# Patient Record
Sex: FEMALE | Employment: FULL TIME | URBAN - METROPOLITAN AREA
[De-identification: names, ages, dates, MRNs, and addresses within clinical notes are randomized per-mention and may not be internally consistent; named-entity substitution may affect disease eponyms.]

---

## 2024-05-17 ENCOUNTER — TELEPHONE (OUTPATIENT)
Dept: ORTHOPEDIC SURGERY | Age: 34
End: 2024-05-17

## 2024-05-17 NOTE — TELEPHONE ENCOUNTER
Forgot to inform pt that the doors would be locked today and instead asked her to bring the MRI disc on Monday during clinic hours. Pt voiced understanding.

## 2024-05-17 NOTE — TELEPHONE ENCOUNTER
Called and spoke to pt about bringing her MRI disc by Tuesday morning otherwise appointment would be rescheduled. Pt voiced understanding. Pt said she would drop it off today.

## 2024-05-22 ENCOUNTER — TELEPHONE (OUTPATIENT)
Dept: ORTHOPEDIC SURGERY | Age: 34
End: 2024-05-22

## 2024-05-22 ENCOUNTER — OFFICE VISIT (OUTPATIENT)
Dept: ORTHOPEDIC SURGERY | Age: 34
End: 2024-05-22
Payer: OTHER GOVERNMENT

## 2024-05-22 VITALS — WEIGHT: 143 LBS | BODY MASS INDEX: 26.31 KG/M2 | HEIGHT: 62 IN

## 2024-05-22 DIAGNOSIS — S83.512A RUPTURE OF ANTERIOR CRUCIATE LIGAMENT OF LEFT KNEE, INITIAL ENCOUNTER: Primary | ICD-10-CM

## 2024-05-22 PROCEDURE — 99204 OFFICE O/P NEW MOD 45 MIN: CPT | Performed by: ORTHOPAEDIC SURGERY

## 2024-05-22 NOTE — TELEPHONE ENCOUNTER
She would like to ask a few more questions about recovery after surgery so she can plan to be out of work. Please call.

## 2024-05-22 NOTE — PROGRESS NOTES
Name: Isamar Reyes  YOB: 1990  Gender: female  MRN: [id number]      What: Left knee pain  How: Playing soccer  When: 3/14/2024        HPI: Isamar Reyes is a 33 y.o. female seen for left knee problems.  She is a [employer] .  No previous left knee problems.  She was playing soccer on 3/14/2024 when she is injured the left knee.  She felt a pop.  The knee swelled up.  It then got a little bit better.  She tried to play soccer again and it would give out on her.  She was sent for an MRI of the left knee.      ROS/Meds/PSH/PMH/FH/SH: A ten system review of systems was performed and is negative other than what is in the HPI.   Tobacco:  reports that she has never smoked. She has never used smokeless tobacco.  Ht 1.575 m (5' 2\")   Wt 64.9 kg (143 lb)   BMI 26.16 kg/m²      Physical Examination:  She is an awake alert pleasant female ambulating without difficulty    The right knee has a range of motion of 0 to 135 degrees  negative Lachman,  negative anterior drawer,   negative posterior drawer  negative pivot.   Good tibial step-off,   No varus or valgus laxity at 0 or 30 degrees.   Negative lateral joint line tenderness   negative lateral Aliza.   Negative medial joint line tenderness  negative medial Aliza.   No evidence of any posterolateral instability.   No patellofemoral pain.   Negative compression,   negative apprehension  no effusion.   Calves Are non-tender,  neurovascularly patient is intact.   Negative Homans.   MPFL is non-tender.   Patient Can fully extend the knee.   Good quad tone  No erythema.   Negative Dial test.    The left knee has a range of motion of 0 to 135 degrees  2+ to Lachman,  2+ anterior drawer,   negative posterior drawer  negative pivot.   Good tibial step-off,   No varus or valgus laxity at 0 or 30 degrees.   Positive lateral joint line tenderness   negative lateral Aliza.   Positive medial joint line tenderness  negative medial Aliza.   No evidence

## 2024-05-22 NOTE — TELEPHONE ENCOUNTER
Called and spoke to pt informing her that she will be able to return back to work 2 weeks after sx or whenever she feels comfortable per [facility]GP. Pt voiced understanding.

## 2024-05-29 ENCOUNTER — TELEPHONE (OUTPATIENT)
Dept: ORTHOPEDIC SURGERY | Age: 34
End: 2024-05-29

## 2024-05-29 NOTE — TELEPHONE ENCOUNTER
Pt  LVM  needing  a letter for  work  sating  her  recovery  time  return to  work  after surgery   Please  call

## 2024-05-29 NOTE — TELEPHONE ENCOUNTER
Called and spoke to pt about her employer requesting a work note. Agreed that we will keep her out of work for 2 weeks following sx and will reassess at her post op appt 6/19. Pt will  work note tomorrow.

## 2024-05-31 NOTE — PERIOP NOTE
Patient verified name and .  Order for consent not found in EHR.   Type 1B surgery, Pat phone assessment complete.  Labs per surgeon:   Orders not received.  Labs per anesthesia protocol: none.    Patient answered medical/surgical history questions at their best of ability. All prior to admission medications documented in EPIC.    Patient instructed to continue taking all prescription medications up to the day of surgery but to take only the following medications the day of surgery according to anesthesia guidelines with a small sip of water: none. Also, patient is requested to take 2 Tylenol in the morning and then again before bed on the day before surgery. Regular or extra strength may be used.       Patient informed that all vitamins and supplements should be held 7 days prior to surgery and NSAIDS 5 days prior to surgery.     Patient instructed on the following:    > Arrive at A Entrance, time of arrival to be called the day before by 1700  > NPO after midnight, unless otherwise indicated, including gum, mints, and ice chips  > Responsible adult must drive patient to the hospital, stay during surgery, and patient will need supervision 24 hours after anesthesia  > Use non moisturizing soap in shower the night before surgery and on the morning of surgery  > All piercings must be removed prior to arrival.    > Leave all valuables (money and jewelry) at home but bring insurance card and ID on DOS.   > Do not wear make-up, nail polish, lotions, cologne, perfumes, powders, or oil on skin. Artificial nails are not permitted.

## 2024-06-03 NOTE — H&P
Subjective:     Patient is a 33 y.o. FEMALE WITH LEFT KNEE PAIN.    SEE OFFICE NOTE.    Patient Active Problem List    Diagnosis Date Noted    Rupture of anterior cruciate ligament of left knee 05/22/2024     History reviewed. No pertinent past medical history.   History reviewed. No pertinent surgical history.   Prior to Admission medications    Not on File     No Known Allergies   Social History     Tobacco Use    Smoking status: Never    Smokeless tobacco: Never   Substance Use Topics    Alcohol use: Yes     Comment: occas      History reviewed. No pertinent family history.   Review of Systems  Pertinent items are noted in HPI.    Objective:     No data found.  Ht 1.575 m (5' 2\")   Wt 64.9 kg (143 lb)   BMI 26.16 kg/m²     General Appearance:    Alert, cooperative, no distress, appears stated age   Head:    Normocephalic, without obvious abnormality, atraumatic                       Back:     Symmetric, no curvature, ROM normal, no CVA tenderness   Lungs:     Clear to auscultation bilaterally, respirations unlabored   Chest Wall:    No tenderness or deformity    Heart:    Regular rate and rhythm, S1 and S2 normal, no murmur, rub   or gallop                   Extremities:   Extremities normal, atraumatic, no cyanosis or edema   Pulses:   2+ and symmetric all extremities   Skin:   Skin color, texture, turgor normal, no rashes or lesions   Lymph nodes:   Cervical, supraclavicular, and axillary nodes normal   Neurologic:   CNII-XII intact, normal strength, sensation and reflexes     throughout           Assessment:     Principal Problem:    Rupture of anterior cruciate ligament of left knee  Resolved Problems:    * No resolved hospital problems. *      Plan:     The various methods of treatment have been discussed with the patient and family.     PATIENT HAS EXHAUSTED NON-OPERATIVE MODALITIES     After consideration of risks, benefits and other options for treatment, the patient has consented to surgical

## 2024-06-05 ENCOUNTER — ANESTHESIA EVENT (OUTPATIENT)
Dept: SURGERY | Age: 34
End: 2024-06-05
Payer: OTHER GOVERNMENT

## 2024-06-05 ENCOUNTER — TELEPHONE (OUTPATIENT)
Dept: ORTHOPEDIC SURGERY | Age: 34
End: 2024-06-05

## 2024-06-05 ENCOUNTER — PREP FOR PROCEDURE (OUTPATIENT)
Dept: ORTHOPEDIC SURGERY | Age: 34
End: 2024-06-05

## 2024-06-05 DIAGNOSIS — S83.512A RUPTURE OF ANTERIOR CRUCIATE LIGAMENT OF LEFT KNEE, INITIAL ENCOUNTER: Primary | ICD-10-CM

## 2024-06-05 RX ORDER — SODIUM CHLORIDE 0.9 % (FLUSH) 0.9 %
5-40 SYRINGE (ML) INJECTION EVERY 12 HOURS SCHEDULED
Status: CANCELLED | OUTPATIENT
Start: 2024-06-05

## 2024-06-05 RX ORDER — SODIUM CHLORIDE 9 MG/ML
INJECTION, SOLUTION INTRAVENOUS PRN
Status: CANCELLED | OUTPATIENT
Start: 2024-06-05

## 2024-06-05 RX ORDER — SODIUM CHLORIDE 0.9 % (FLUSH) 0.9 %
5-40 SYRINGE (ML) INJECTION PRN
Status: CANCELLED | OUTPATIENT
Start: 2024-06-05

## 2024-06-06 ENCOUNTER — APPOINTMENT (OUTPATIENT)
Dept: GENERAL RADIOLOGY | Age: 34
End: 2024-06-06
Attending: ORTHOPAEDIC SURGERY
Payer: OTHER GOVERNMENT

## 2024-06-06 ENCOUNTER — PREP FOR PROCEDURE (OUTPATIENT)
Dept: ORTHOPEDIC SURGERY | Age: 34
End: 2024-06-06

## 2024-06-06 ENCOUNTER — ANESTHESIA (OUTPATIENT)
Dept: SURGERY | Age: 34
End: 2024-06-06
Payer: OTHER GOVERNMENT

## 2024-06-06 ENCOUNTER — HOSPITAL ENCOUNTER (OUTPATIENT)
Age: 34
Setting detail: OUTPATIENT SURGERY
Discharge: HOME | End: 2024-06-06
Attending: ORTHOPAEDIC SURGERY | Admitting: ORTHOPAEDIC SURGERY
Payer: OTHER GOVERNMENT

## 2024-06-06 VITALS
WEIGHT: 144.2 LBS | DIASTOLIC BLOOD PRESSURE: 74 MMHG | OXYGEN SATURATION: 97 % | RESPIRATION RATE: 16 BRPM | BODY MASS INDEX: 26.54 KG/M2 | HEIGHT: 62 IN | SYSTOLIC BLOOD PRESSURE: 120 MMHG | HEART RATE: 92 BPM | TEMPERATURE: 98 F

## 2024-06-06 DIAGNOSIS — S83.512D RUPTURE OF ANTERIOR CRUCIATE LIGAMENT OF LEFT KNEE, SUBSEQUENT ENCOUNTER: Primary | ICD-10-CM

## 2024-06-06 DIAGNOSIS — S83.512A RUPTURE OF ANTERIOR CRUCIATE LIGAMENT OF LEFT KNEE, INITIAL ENCOUNTER: ICD-10-CM

## 2024-06-06 DIAGNOSIS — S83.512A RUPTURE OF ANTERIOR CRUCIATE LIGAMENT OF LEFT KNEE, INITIAL ENCOUNTER: Primary | ICD-10-CM

## 2024-06-06 PROBLEM — S83.262A PERIPHERAL TEAR OF LATERAL MENISCUS OF LEFT KNEE: Status: ACTIVE | Noted: 2024-06-06

## 2024-06-06 LAB — HCG UR QL: NEGATIVE

## 2024-06-06 PROCEDURE — 6360000002 HC RX W HCPCS: Performed by: ANESTHESIOLOGY

## 2024-06-06 PROCEDURE — 3600000014 HC SURGERY LEVEL 4 ADDTL 15MIN: Performed by: ORTHOPAEDIC SURGERY

## 2024-06-06 PROCEDURE — 2709999900 HC NON-CHARGEABLE SUPPLY: Performed by: ORTHOPAEDIC SURGERY

## 2024-06-06 PROCEDURE — 7100000011 HC PHASE II RECOVERY - ADDTL 15 MIN: Performed by: ORTHOPAEDIC SURGERY

## 2024-06-06 PROCEDURE — 64447 NJX AA&/STRD FEMORAL NRV IMG: CPT | Performed by: ANESTHESIOLOGY

## 2024-06-06 PROCEDURE — 2500000003 HC RX 250 WO HCPCS: Performed by: ANESTHESIOLOGY

## 2024-06-06 PROCEDURE — C1713 ANCHOR/SCREW BN/BN,TIS/BN: HCPCS | Performed by: ORTHOPAEDIC SURGERY

## 2024-06-06 PROCEDURE — 73560 X-RAY EXAM OF KNEE 1 OR 2: CPT

## 2024-06-06 PROCEDURE — 3700000001 HC ADD 15 MINUTES (ANESTHESIA): Performed by: ORTHOPAEDIC SURGERY

## 2024-06-06 PROCEDURE — 81025 URINE PREGNANCY TEST: CPT

## 2024-06-06 PROCEDURE — 3600000004 HC SURGERY LEVEL 4 BASE: Performed by: ORTHOPAEDIC SURGERY

## 2024-06-06 PROCEDURE — 7100000001 HC PACU RECOVERY - ADDTL 15 MIN: Performed by: ORTHOPAEDIC SURGERY

## 2024-06-06 PROCEDURE — 6360000002 HC RX W HCPCS: Performed by: ORTHOPAEDIC SURGERY

## 2024-06-06 PROCEDURE — 2580000003 HC RX 258: Performed by: ANESTHESIOLOGY

## 2024-06-06 PROCEDURE — 2500000003 HC RX 250 WO HCPCS: Performed by: REGISTERED NURSE

## 2024-06-06 PROCEDURE — 7100000000 HC PACU RECOVERY - FIRST 15 MIN: Performed by: ORTHOPAEDIC SURGERY

## 2024-06-06 PROCEDURE — 6360000002 HC RX W HCPCS: Performed by: REGISTERED NURSE

## 2024-06-06 PROCEDURE — L1830 KO IMMOB CANVAS LONG PRE OTS: HCPCS | Performed by: ORTHOPAEDIC SURGERY

## 2024-06-06 PROCEDURE — 2720000010 HC SURG SUPPLY STERILE: Performed by: ORTHOPAEDIC SURGERY

## 2024-06-06 PROCEDURE — 6370000000 HC RX 637 (ALT 250 FOR IP): Performed by: ANESTHESIOLOGY

## 2024-06-06 PROCEDURE — 3700000000 HC ANESTHESIA ATTENDED CARE: Performed by: ORTHOPAEDIC SURGERY

## 2024-06-06 PROCEDURE — 7100000010 HC PHASE II RECOVERY - FIRST 15 MIN: Performed by: ORTHOPAEDIC SURGERY

## 2024-06-06 PROCEDURE — C1776 JOINT DEVICE (IMPLANTABLE): HCPCS | Performed by: ORTHOPAEDIC SURGERY

## 2024-06-06 DEVICE — CONTOUR MENISCUS ARROW 1.1MM X 13MM
Type: IMPLANTABLE DEVICE | Site: KNEE | Status: FUNCTIONAL
Brand: COPNTOUR MENISCUS ARROW

## 2024-06-06 DEVICE — Ø8X 20MM PEEK IF SCRW NON-VENTED
Type: IMPLANTABLE DEVICE | Site: KNEE | Status: FUNCTIONAL
Brand: ARTHREX®

## 2024-06-06 DEVICE — WASHER ORTH L5.5MM TI POST SCR: Type: IMPLANTABLE DEVICE | Site: KNEE | Status: FUNCTIONAL

## 2024-06-06 RX ORDER — PROPOFOL 10 MG/ML
INJECTION, EMULSION INTRAVENOUS PRN
Status: DISCONTINUED | OUTPATIENT
Start: 2024-06-06 | End: 2024-06-06 | Stop reason: SDUPTHER

## 2024-06-06 RX ORDER — EPHEDRINE SULFATE/0.9% NACL/PF 50 MG/5 ML
SYRINGE (ML) INTRAVENOUS PRN
Status: DISCONTINUED | OUTPATIENT
Start: 2024-06-06 | End: 2024-06-06 | Stop reason: SDUPTHER

## 2024-06-06 RX ORDER — SODIUM CHLORIDE, SODIUM LACTATE, POTASSIUM CHLORIDE, CALCIUM CHLORIDE 600; 310; 30; 20 MG/100ML; MG/100ML; MG/100ML; MG/100ML
INJECTION, SOLUTION INTRAVENOUS CONTINUOUS
Status: DISCONTINUED | OUTPATIENT
Start: 2024-06-06 | End: 2024-06-06 | Stop reason: HOSPADM

## 2024-06-06 RX ORDER — KETOROLAC TROMETHAMINE 10 MG/1
TABLET, FILM COATED ORAL
Qty: 20 TABLET | Refills: 0 | Status: SHIPPED | OUTPATIENT
Start: 2024-06-06

## 2024-06-06 RX ORDER — NALOXONE HYDROCHLORIDE 0.4 MG/ML
INJECTION, SOLUTION INTRAMUSCULAR; INTRAVENOUS; SUBCUTANEOUS PRN
Status: DISCONTINUED | OUTPATIENT
Start: 2024-06-06 | End: 2024-06-06 | Stop reason: HOSPADM

## 2024-06-06 RX ORDER — FENTANYL CITRATE 50 UG/ML
100 INJECTION, SOLUTION INTRAMUSCULAR; INTRAVENOUS
Status: COMPLETED | OUTPATIENT
Start: 2024-06-06 | End: 2024-06-06

## 2024-06-06 RX ORDER — SODIUM CHLORIDE 9 MG/ML
INJECTION, SOLUTION INTRAVENOUS PRN
Status: DISCONTINUED | OUTPATIENT
Start: 2024-06-06 | End: 2024-06-06 | Stop reason: HOSPADM

## 2024-06-06 RX ORDER — LIDOCAINE HYDROCHLORIDE 20 MG/ML
INJECTION, SOLUTION EPIDURAL; INFILTRATION; INTRACAUDAL; PERINEURAL PRN
Status: DISCONTINUED | OUTPATIENT
Start: 2024-06-06 | End: 2024-06-06 | Stop reason: SDUPTHER

## 2024-06-06 RX ORDER — SODIUM CHLORIDE 0.9 % (FLUSH) 0.9 %
5-40 SYRINGE (ML) INJECTION EVERY 12 HOURS SCHEDULED
Status: CANCELLED | OUTPATIENT
Start: 2024-06-06

## 2024-06-06 RX ORDER — DEXAMETHASONE SODIUM PHOSPHATE 4 MG/ML
INJECTION, SOLUTION INTRA-ARTICULAR; INTRALESIONAL; INTRAMUSCULAR; INTRAVENOUS; SOFT TISSUE
Status: COMPLETED | OUTPATIENT
Start: 2024-06-06 | End: 2024-06-06

## 2024-06-06 RX ORDER — SODIUM CHLORIDE 0.9 % (FLUSH) 0.9 %
5-40 SYRINGE (ML) INJECTION PRN
Status: DISCONTINUED | OUTPATIENT
Start: 2024-06-06 | End: 2024-06-06 | Stop reason: HOSPADM

## 2024-06-06 RX ORDER — MIDAZOLAM HYDROCHLORIDE 2 MG/2ML
4 INJECTION, SOLUTION INTRAMUSCULAR; INTRAVENOUS
Status: COMPLETED | OUTPATIENT
Start: 2024-06-06 | End: 2024-06-06

## 2024-06-06 RX ORDER — OXYCODONE HYDROCHLORIDE 10 MG/1
10 TABLET ORAL EVERY 6 HOURS PRN
Qty: 28 TABLET | Refills: 0 | Status: SHIPPED | OUTPATIENT
Start: 2024-06-06 | End: 2024-06-13

## 2024-06-06 RX ORDER — FENTANYL CITRATE 50 UG/ML
INJECTION, SOLUTION INTRAMUSCULAR; INTRAVENOUS PRN
Status: DISCONTINUED | OUTPATIENT
Start: 2024-06-06 | End: 2024-06-06 | Stop reason: SDUPTHER

## 2024-06-06 RX ORDER — SODIUM CHLORIDE 0.9 % (FLUSH) 0.9 %
5-40 SYRINGE (ML) INJECTION EVERY 12 HOURS SCHEDULED
Status: DISCONTINUED | OUTPATIENT
Start: 2024-06-06 | End: 2024-06-06 | Stop reason: HOSPADM

## 2024-06-06 RX ORDER — MIDAZOLAM HYDROCHLORIDE 1 MG/ML
INJECTION INTRAMUSCULAR; INTRAVENOUS PRN
Status: DISCONTINUED | OUTPATIENT
Start: 2024-06-06 | End: 2024-06-06 | Stop reason: SDUPTHER

## 2024-06-06 RX ORDER — OXYCODONE HYDROCHLORIDE 5 MG/1
5 TABLET ORAL
Status: COMPLETED | OUTPATIENT
Start: 2024-06-06 | End: 2024-06-06

## 2024-06-06 RX ORDER — LIDOCAINE HYDROCHLORIDE 10 MG/ML
1 INJECTION, SOLUTION INFILTRATION; PERINEURAL
Status: DISCONTINUED | OUTPATIENT
Start: 2024-06-06 | End: 2024-06-06 | Stop reason: HOSPADM

## 2024-06-06 RX ORDER — LIDOCAINE HYDROCHLORIDE 10 MG/ML
1 INJECTION, SOLUTION INFILTRATION; PERINEURAL
Status: COMPLETED | OUTPATIENT
Start: 2024-06-06 | End: 2024-06-06

## 2024-06-06 RX ORDER — DEXAMETHASONE SODIUM PHOSPHATE 10 MG/ML
INJECTION INTRAMUSCULAR; INTRAVENOUS PRN
Status: DISCONTINUED | OUTPATIENT
Start: 2024-06-06 | End: 2024-06-06 | Stop reason: SDUPTHER

## 2024-06-06 RX ORDER — ONDANSETRON 2 MG/ML
4 INJECTION INTRAMUSCULAR; INTRAVENOUS
Status: COMPLETED | OUTPATIENT
Start: 2024-06-06 | End: 2024-06-06

## 2024-06-06 RX ORDER — PROMETHAZINE HYDROCHLORIDE 25 MG/1
25 TABLET ORAL EVERY 6 HOURS PRN
Qty: 20 TABLET | Refills: 0 | Status: SHIPPED | OUTPATIENT
Start: 2024-06-06

## 2024-06-06 RX ORDER — SODIUM CHLORIDE 0.9 % (FLUSH) 0.9 %
5-40 SYRINGE (ML) INJECTION PRN
Status: CANCELLED | OUTPATIENT
Start: 2024-06-06

## 2024-06-06 RX ORDER — ONDANSETRON 2 MG/ML
INJECTION INTRAMUSCULAR; INTRAVENOUS PRN
Status: DISCONTINUED | OUTPATIENT
Start: 2024-06-06 | End: 2024-06-06 | Stop reason: SDUPTHER

## 2024-06-06 RX ORDER — NALOXONE HYDROCHLORIDE 4 MG/.1ML
1 SPRAY NASAL PRN
Qty: 1 EACH | Refills: 0 | Status: SHIPPED | OUTPATIENT
Start: 2024-06-06

## 2024-06-06 RX ORDER — SODIUM CHLORIDE 9 MG/ML
INJECTION, SOLUTION INTRAVENOUS PRN
Status: CANCELLED | OUTPATIENT
Start: 2024-06-06

## 2024-06-06 RX ADMIN — HYDROMORPHONE HYDROCHLORIDE 0.5 MG: 1 INJECTION, SOLUTION INTRAMUSCULAR; INTRAVENOUS; SUBCUTANEOUS at 10:49

## 2024-06-06 RX ADMIN — PHENYLEPHRINE HYDROCHLORIDE 150 MCG: 0.1 INJECTION, SOLUTION INTRAVENOUS at 09:33

## 2024-06-06 RX ADMIN — DEXAMETHASONE SODIUM PHOSPHATE 4 MG: 4 INJECTION, SOLUTION INTRAMUSCULAR; INTRAVENOUS at 08:30

## 2024-06-06 RX ADMIN — PHENYLEPHRINE HYDROCHLORIDE 100 MCG: 0.1 INJECTION, SOLUTION INTRAVENOUS at 09:20

## 2024-06-06 RX ADMIN — HYDROMORPHONE HYDROCHLORIDE 0.5 MG: 1 INJECTION, SOLUTION INTRAMUSCULAR; INTRAVENOUS; SUBCUTANEOUS at 10:54

## 2024-06-06 RX ADMIN — ONDANSETRON 4 MG: 2 INJECTION INTRAMUSCULAR; INTRAVENOUS at 09:05

## 2024-06-06 RX ADMIN — DEXAMETHASONE SODIUM PHOSPHATE 10 MG: 10 INJECTION INTRAMUSCULAR; INTRAVENOUS at 09:05

## 2024-06-06 RX ADMIN — LIDOCAINE HYDROCHLORIDE 1 ML: 10 INJECTION, SOLUTION INFILTRATION; PERINEURAL at 07:26

## 2024-06-06 RX ADMIN — Medication 5 MG: at 09:57

## 2024-06-06 RX ADMIN — Medication 2000 MG: at 09:05

## 2024-06-06 RX ADMIN — FENTANYL CITRATE 50 MCG: 50 INJECTION INTRAMUSCULAR; INTRAVENOUS at 08:30

## 2024-06-06 RX ADMIN — ROPIVACAINE HYDROCHLORIDE 15 ML: 10 INJECTION, SOLUTION EPIDURAL at 08:30

## 2024-06-06 RX ADMIN — PHENYLEPHRINE HYDROCHLORIDE 100 MCG: 0.1 INJECTION, SOLUTION INTRAVENOUS at 09:27

## 2024-06-06 RX ADMIN — LIDOCAINE HYDROCHLORIDE 100 MG: 20 INJECTION, SOLUTION EPIDURAL; INFILTRATION; INTRACAUDAL; PERINEURAL at 08:57

## 2024-06-06 RX ADMIN — PROPOFOL 200 MG: 10 INJECTION, EMULSION INTRAVENOUS at 08:57

## 2024-06-06 RX ADMIN — HYDROMORPHONE HYDROCHLORIDE 0.5 MG: 1 INJECTION, SOLUTION INTRAMUSCULAR; INTRAVENOUS; SUBCUTANEOUS at 10:41

## 2024-06-06 RX ADMIN — ONDANSETRON 4 MG: 2 INJECTION INTRAMUSCULAR; INTRAVENOUS at 11:02

## 2024-06-06 RX ADMIN — SODIUM CHLORIDE, SODIUM LACTATE, POTASSIUM CHLORIDE, AND CALCIUM CHLORIDE: 600; 310; 30; 20 INJECTION, SOLUTION INTRAVENOUS at 07:25

## 2024-06-06 RX ADMIN — EPINEPHRINE 15 ML: 1 INJECTION, SOLUTION, CONCENTRATE INTRAVENOUS at 08:30

## 2024-06-06 RX ADMIN — Medication 5 MG: at 09:36

## 2024-06-06 RX ADMIN — MIDAZOLAM 2 MG: 1 INJECTION INTRAMUSCULAR; INTRAVENOUS at 08:51

## 2024-06-06 RX ADMIN — Medication 5 MG: at 10:11

## 2024-06-06 RX ADMIN — MIDAZOLAM 3 MG: 1 INJECTION INTRAMUSCULAR; INTRAVENOUS at 08:30

## 2024-06-06 RX ADMIN — HYDROMORPHONE HYDROCHLORIDE 0.5 MG: 1 INJECTION, SOLUTION INTRAMUSCULAR; INTRAVENOUS; SUBCUTANEOUS at 10:59

## 2024-06-06 RX ADMIN — PHENYLEPHRINE HYDROCHLORIDE 100 MCG: 0.1 INJECTION, SOLUTION INTRAVENOUS at 09:11

## 2024-06-06 RX ADMIN — SODIUM CHLORIDE, SODIUM LACTATE, POTASSIUM CHLORIDE, AND CALCIUM CHLORIDE: 600; 310; 30; 20 INJECTION, SOLUTION INTRAVENOUS at 09:19

## 2024-06-06 RX ADMIN — PHENYLEPHRINE HYDROCHLORIDE 50 MCG: 0.1 INJECTION, SOLUTION INTRAVENOUS at 09:02

## 2024-06-06 RX ADMIN — OXYCODONE 5 MG: 5 TABLET ORAL at 11:03

## 2024-06-06 RX ADMIN — FENTANYL CITRATE 100 MCG: 50 INJECTION, SOLUTION INTRAMUSCULAR; INTRAVENOUS at 09:12

## 2024-06-06 ASSESSMENT — PAIN DESCRIPTION - DESCRIPTORS
DESCRIPTORS: THROBBING
DESCRIPTORS: ACHING
DESCRIPTORS: THROBBING
DESCRIPTORS: SHARP;THROBBING
DESCRIPTORS: THROBBING
DESCRIPTORS: THROBBING

## 2024-06-06 ASSESSMENT — PAIN DESCRIPTION - ORIENTATION
ORIENTATION: LEFT

## 2024-06-06 ASSESSMENT — PAIN DESCRIPTION - LOCATION
LOCATION: KNEE

## 2024-06-06 ASSESSMENT — PAIN SCALES - GENERAL
PAINLEVEL_OUTOF10: 5
PAINLEVEL_OUTOF10: 9
PAINLEVEL_OUTOF10: 8
PAINLEVEL_OUTOF10: 6
PAINLEVEL_OUTOF10: 9
PAINLEVEL_OUTOF10: 8
PAINLEVEL_OUTOF10: 6
PAINLEVEL_OUTOF10: 9
PAINLEVEL_OUTOF10: 7
PAINLEVEL_OUTOF10: 5
PAINLEVEL_OUTOF10: 9
PAINLEVEL_OUTOF10: 3
PAINLEVEL_OUTOF10: 9

## 2024-06-06 ASSESSMENT — PAIN - FUNCTIONAL ASSESSMENT: PAIN_FUNCTIONAL_ASSESSMENT: 0-10

## 2024-06-06 NOTE — H&P
Update History & Physical    The patient's History and Physical of May 22, 2024 was reviewed with the patient and I examined the patient. There was no change. The surgical site was confirmed by the patient and me.       Plan: The risks, benefits, expected outcome, and alternative to the recommended procedure have been discussed with the patient. Patient understands and wants to proceed with the procedure.     Electronically signed by SEAN WERNER JR, MD on 6/6/2024 at 6:24 AM

## 2024-06-06 NOTE — ANESTHESIA POSTPROCEDURE EVALUATION
Department of Anesthesiology  Postprocedure Note    Patient: Isamar Reyes  MRN: [id number]  YOB: 1990  Date of evaluation: 6/6/2024    Procedure Summary       Date: 06/06/24 Room / Location: [facility]  / [facility]    Anesthesia Start: 0844 Anesthesia Stop: 1037    Procedure: ARTHROSCOPY LEFT KNEE ANTERIOR CRUCIATE LIGAMENT RECONSTRUCTION UTILIZING AUTOGENOUS, IPSILATERAL BONE PATELLA TENDON BONE GRAFT, LATERAL MENISCAL REPAIR (Left: Knee) Diagnosis:       Rupture of anterior cruciate ligament of left knee      (Rupture of anterior cruciate ligament of left knee [S83.512A])    Surgeons: Yung Montoya Jr., MD Responsible Provider: Gonzalez Mejia Jr., MD    Anesthesia Type: general ASA Status: 1            Anesthesia Type: No value filed.    Taj Phase I: Taj Score: 10    Taj Phase II: Taj Score: 10    Anesthesia Post Evaluation    Patient location during evaluation: PACU  Patient participation: complete - patient participated  Level of consciousness: awake  Pain score: 0  Airway patency: patent  Nausea & Vomiting: no nausea and no vomiting  Cardiovascular status: blood pressure returned to baseline and hemodynamically stable  Respiratory status: acceptable, spontaneous ventilation and nonlabored ventilation  Hydration status: euvolemic  Multimodal analgesia pain management approach  Pain management: adequate    No notable events documented.

## 2024-06-06 NOTE — OP NOTE
meniscal arrows, a lateral meniscal repair was then achieved.  This showed an excellent repair, which was stable.  Scope was then advanced back into the notch.  ACL stump and soft tissue were debrided utilizing a shaver and notchplasty was then performed utilizing acromionizer hyun.  ACL tibial drill guide was inserted into appropriate position on the tibial spine.  The guidewire was then passed and noted to be in excellent position.  The tibial tunnel was reamed with a 6, 8, and 10.5 mm reamer.  The tunnel was debrided with soft tissue shaver and acromionized using a bur.  The femoral tunnel was then drilled via freehand technique from the anteromedial portal, first with the 6, 8, and 10.5 mm reamer.  The tunnel was posterior with posterior cortex intact.  Beath pin was then passed out the lateral cortex.  A #5 suture was passed from the femoral tunnel down through the tibial tunnel.  At this point, the autograft bone, patella tendon bone graft was then contoured to fit a 10 mm tunnel.  The graft was passed up the tibial tunnel into the femoral tunnel.  Femoral fixation was achieved utilizing an 8 x 20 mm bioabsorbable interference screw.  Anesthesia wake-up test was performed.  Femoral fixation was stable.  Tibial fixation was achieved utilizing Med Surg post and screw.  The graft was tensioned at 60 degrees of posterior drawer.  Med Surg post and screw were assembled in the  standard fashion.  At this point, the knee was then examined.  There was no further evidence of Lachman or pivot   as the knee was put through full range of motion.  Scope was introduced back in the knee.  Bone plugs were completely sealed within the bony tunnels.  Lateral meniscal repair was stable.  At this point, with the procedure complete, the knee was then copiously irrigated.  Arthroscopic equipment was removed from the knee.  The wound was then closed with 0 Vicryl figure-of-eight sutures and 2-0 Prolene subcuticular stitch.  A

## 2024-06-06 NOTE — ANESTHESIA PRE PROCEDURE
Department of Anesthesiology  Preprocedure Note       Name:  Isamar Reyes   Age:  33 y.o.  :  1990                                          MRN:  [id number]         Date:  2024      Surgeon: Surgeon(s):  Yung Montoya Jr., MD    Procedure: Procedure(s):  left knee arthroscopy, open reconstruction of the right anterior curciate ligament utilizing autogenous ipsilateral bone patella tendon bone graft. general/femoral    Medications prior to admission:   Prior to Admission medications    Medication Sig Start Date End Date Taking? Authorizing Provider   oxyCODONE HCl (OXY-IR) 10 MG immediate release tablet Take 1 tablet by mouth every 6 hours as needed for Pain for up to 7 days. Max Daily Amount: 40 mg 24 Yes Yung Montoya Jr., MD   promethazine (PHENERGAN) 25 MG tablet Take 1 tablet by mouth every 6 hours as needed for Nausea 24  Yes Yung Montoya Jr., MD   ketorolac (TORADOL) 10 MG tablet Take 1 tablet every 6 hours for 5 days post-op 24  Yes Yung Montoya Jr., MD   naloxone 4 MG/0.1ML LIQD nasal spray 1 spray by Nasal route as needed for Opioid Reversal 24  Yes Yung Montoya Jr., MD   ketorolac (TORADOL) 10 MG tablet Take 1 tablet by mouth every 6 hours for 5 days post-op 24  Yes Yung Montoya Jr., MD       Current medications:    Current Facility-Administered Medications   Medication Dose Route Frequency Provider Last Rate Last Admin   • lidocaine 1 % injection 1 mL  1 mL IntraDERmal Once PRN Gonzalez Mejia Jr., MD       • sodium chloride flush 0.9 % injection 5-40 mL  5-40 mL IntraVENous 2 times per day Gonzalez Mejia Jr., MD       • sodium chloride flush 0.9 % injection 5-40 mL  5-40 mL IntraVENous PRN Gonzalez Mejia Jr., MD       • 0.9 % sodium chloride infusion   IntraVENous PRN Gonzalez Mejia Jr., MD       • fentaNYL (SUBLIMAZE) injection 100 mcg  100 mcg IntraVENous Once PRN Gonzalez Mejia Jr., MD       • lactated ringers IV soln infusion   IntraVENous

## 2024-06-06 NOTE — BRIEF OP NOTE
BRIEF OPERATIVE NOTE    Date of Procedure: 6/6/2024     Preoperative Diagnosis:  ACL TEAR LEFT KNEE    Postoperative Diagnosis:  SAME      LATERAL MENISCAL TEAR LEFT KNEE    Procedure(s)  ARTHROSCOPY LEFT KNEE ANTERIOR CRUCIATE LIGAMENT RECONSTRUCTION UTILIZING AUTOGENOUS, IPSILATERAL BONE PATELLA TENDON BONE GRAFT, LATERAL MENISCAL REPAIR    Surgeon(s) and Role:     * Yung Montoya Jr., MD - Primary         Assistant Staff:  NONE    Surgical Staff:  Kimulator: Elizabeth Hope RN  Surgical Assistant: Cynthia Crenshaw  Scrub Person First: Fe Hansen  Scrub Person Second: Anh Bose RN      * Missing procedure start or end time(s) *     Anesthesia:  GENERAL WITH FEMORAL NERVE BLOCK    Estimated Blood Loss: 30 cc.    Complications: NONE    Implants:   Implant Name Type Inv. Item Serial No.  Lot No. LRB No. Used Action   WASHER ORTH L5.5MM TI POST SCR - [id number]  WASHER ORTH L5.5MM TI POST SCR  [employer]WD [id number] Left 1 Implanted   IMPLANT MENIS L13MM DIA1.1MM PROPRIETARY SELF REINF - [id number]  IMPLANT MENIS L13MM DIA1.1MM PROPRIETARY SELF REINF  [employer]WD [id number] Left 1 Implanted   IMPLANT MENIS L13MM DIA1.1MM PROPRIETARY SELF REINF - [id number]  IMPLANT MENIS L13MM DIA1.1MM PROPRIETARY SELF REINF  [employer]WD [id number] Left 2 Implanted   SCREW INTERF PEEK NON VENT 8X20MM - [id number]  SCREW INTERF PEEK NON VENT 8X20MM  [employer]-WD [id number] Left 1 Implanted       TOURNIQUET:  65 MINUTES    YUNG MONTOYA JR, MD

## 2024-06-06 NOTE — ANESTHESIA PROCEDURE NOTES
Administered  dexAMETHasone (DECADRON) injection 4 mg/mL - Perineural   4 mg - 6/6/2024 8:30:00 AM  ROPivacaine 1% with EPINEPHrine 1:769220 injection (ANESTHESIA USE ONLY) (Mixture components: EPINEPHrine PF 1 MG/ML Soln, 0.005 mL; ROPivacaine 10 MG/ML Soln, 1 mL) - Perineural   15 mL - 6/6/2024 8:30:00 AM  mepivacaine 1.5% with EPINEPHrine 1:200,000 injection (ANESTHESIA USE ONLY) (Mixture components: EPINEPHrine PF 1 MG/ML Soln, 0.005 mL; mepivacaine 1.5 % Soln, 1 mL) - Perineural   15 mL - 6/6/2024 8:30:00 AM

## 2024-06-06 NOTE — PERIOP NOTE
Discharge instructions reviewed with pt and family member with  who verbalize understanding of follow up care.

## 2024-06-06 NOTE — DISCHARGE INSTRUCTIONS
INSTRUCTIONS FOLLOWING ARTHROSCOPY SURGERY  Dr. Montoya [phone]  Cynthia (Dr. Montoya's assistant) will call you tomorrow regarding your plan of care and further instructions    ACTIVITY   As tolerated and as directed by your doctor   Elevate surgery site first 48 hours.   Use arm sling or crutches per your doctor's instructions.   Bathe or shower as directed by your doctor.    DIET   Clear liquids until no nausea or vomiting; then light diet for the first day   Advance to regular diet on second day, unless your doctor orders otherwise.   If nausea and vomiting continues, call your doctor.    PAIN   Take pain medication as directed by your doctor.   Call your doctor if pain is NOT relieved by medication.   DO NOT take aspirin or blood thinners until directed by your doctor.    DRESSING CARE: Follow all dressing care instructions provided by Dr. Montoya    FOLLOW-UP PHONE CALLS   Someone from Dr Montoya's office will call tomorrow with further instructions.   If you have any problems or concerns, call your doctor as needed.    CALL YOUR DOCTOR IF   Excessive bleeding that does not stop after holding mild pressure over the area   Temperature of 101°F or above   Redness, excessive swelling or bruising, and/or green or yellow, smelly discharge from incision    MEDICATION INTERACTION:    During your procedure you potentially received a medication or medications which may reduce the effectiveness of oral contraceptives. Please consider other forms of contraception for 1 month following your procedure if you are currently using oral contraceptives as your primary form of birth control. In addition to this, we recommend continuing your oral contraceptive as prescribed, unless otherwise instructed by your physician, during this time.    After general anesthesia or intravenous sedation, for 24 hours or while taking prescription Narcotics:  Limit your activities  A responsible adult needs to be with you for the next 24 hours  Do not

## 2024-06-07 ENCOUNTER — TELEPHONE (OUTPATIENT)
Dept: ORTHOPEDIC SURGERY | Age: 34
End: 2024-06-07

## 2024-06-07 DIAGNOSIS — S83.512A RUPTURE OF ANTERIOR CRUCIATE LIGAMENT OF LEFT KNEE, INITIAL ENCOUNTER: Primary | ICD-10-CM

## 2024-06-07 RX ORDER — PROMETHAZINE HYDROCHLORIDE 25 MG/1
25 TABLET ORAL EVERY 6 HOURS PRN
Qty: 20 TABLET | Refills: 0 | Status: SHIPPED | OUTPATIENT
Start: 2024-06-07

## 2024-06-07 RX ORDER — OXYCODONE HYDROCHLORIDE 10 MG/1
10 TABLET ORAL EVERY 6 HOURS PRN
Qty: 28 TABLET | Refills: 0 | Status: SHIPPED | OUTPATIENT
Start: 2024-06-07 | End: 2024-06-14

## 2024-06-07 NOTE — TELEPHONE ENCOUNTER
Returned call and was unable to LVM due to the caller being \"unavailable\" and no VM box set up. Will try again later.

## 2024-06-07 NOTE — TELEPHONE ENCOUNTER
I called and was able to speak to the patient. Went over post op instructions. Pt states she is doing well, she can wiggle her toes, has CPM machine, and is set up with a PT appt on Monday. She states Chelita told her that there were not prescriptions for Oxy10 or Phenergan for her to , but she was able to fill Narcan and Toradol. Called and confirmed with pharmacy that they did not receive Oxy10 or Phenergan rx. New rx routed.

## 2024-06-07 NOTE — TELEPHONE ENCOUNTER
Attempted to call again to provide post op instructions, but there was no answer and was unable to LVM.

## 2024-06-10 ENCOUNTER — OFFICE VISIT (OUTPATIENT)
Dept: ORTHOPEDIC SURGERY | Age: 34
End: 2024-06-10

## 2024-06-10 DIAGNOSIS — Z48.89 ENCOUNTER FOR POSTOPERATIVE CARE: Primary | ICD-10-CM

## 2024-06-10 PROCEDURE — 99024 POSTOP FOLLOW-UP VISIT: CPT | Performed by: ORTHOPAEDIC SURGERY

## 2024-06-10 NOTE — PROGRESS NOTES
Name: Isamar Reyes  YOB: 1990  Gender: female  MRN: [id number]            HPI: Isamar Reyes is a 33 y.o. female 4 days status post arthroscopy left knee ACL reconstruction utilizing autogenous ipsilateral bone patella tendon bone graft and lateral meniscal repair.  She returns and is doing well      ROS/Meds/PSH/PMH/FH/SH: A ten system review of systems was performed and is negative other than what is in the HPI.   Tobacco:  reports that she has never smoked. She has never used smokeless tobacco.  There were no vitals taken for this visit.     Physical Examination:  She is an awake alert pleasant female ambulating without difficulty    The right knee has a range of motion of 0 to 135 degrees  negative Lachman,  negative anterior drawer,   negative posterior drawer  negative pivot.   Good tibial step-off,   No varus or valgus laxity at 0 or 30 degrees.   Negative lateral joint line tenderness   negative lateral Aliza.   Negative medial joint line tenderness  negative medial Aliza.   No evidence of any posterolateral instability.   No patellofemoral pain.   Negative compression,   negative apprehension  no effusion.   Calves Are non-tender,  neurovascularly patient is intact.   Negative Homans.   MPFL is non-tender.   Patient Can fully extend the knee.   Good quad tone  No erythema.   Negative Dial test.    The left knee incision is intact  The dressing was changed  Range of motion left knee is 0-70  No instability  Minimal bruising  Calves are nontender  Neurovascular she is intact            Data Reviewed:    Multiple x-rays left knee dated 3/14/2024 demonstrate a preserved joint space in all 3 compartments.  No fracture.  No dislocation.    MRI left knee from the outside dated 5/21/2024 demonstrates the ACL to be torn.  PCL is intact.  Medial and lateral menisci are intact.  There is a bone bruise in the lateral compartment.  There is any effusion                   Impression:   1.

## 2024-06-19 ENCOUNTER — OFFICE VISIT (OUTPATIENT)
Dept: ORTHOPEDIC SURGERY | Age: 34
End: 2024-06-19

## 2024-06-19 DIAGNOSIS — Z48.89 ENCOUNTER FOR POSTOPERATIVE CARE: Primary | ICD-10-CM

## 2024-06-19 PROCEDURE — 99024 POSTOP FOLLOW-UP VISIT: CPT | Performed by: ORTHOPAEDIC SURGERY

## 2024-06-19 NOTE — PROGRESS NOTES
Name: Isamar Reyes  YOB: 1990  Gender: female  MRN: [id number]            HPI: Isamar Reyes is a 33 y.o. female 2 weeks status post arthroscopy left knee ACL reconstruction utilizing autogenous ipsilateral bone patella tendon bone graft and lateral meniscal repair.  She returns and is doing well      ROS/Meds/PSH/PMH/FH/SH: A ten system review of systems was performed and is negative other than what is in the HPI.   Tobacco:  reports that she has never smoked. She has never used smokeless tobacco.  There were no vitals taken for this visit.     Physical Examination:  She is an awake alert pleasant female ambulating without difficulty    The right knee has a range of motion of 0 to 135 degrees  negative Lachman,  negative anterior drawer,   negative posterior drawer  negative pivot.   Good tibial step-off,   No varus or valgus laxity at 0 or 30 degrees.   Negative lateral joint line tenderness   negative lateral Aliza.   Negative medial joint line tenderness  negative medial Aliza.   No evidence of any posterolateral instability.   No patellofemoral pain.   Negative compression,   negative apprehension  no effusion.   Calves Are non-tender,  neurovascularly patient is intact.   Negative Homans.   MPFL is non-tender.   Patient Can fully extend the knee.   Good quad tone  No erythema.   Negative Dial test.    The left knee incision has healed  The suture was removed  Range of motion left knee is 0-90  No instability  Minimal bruising  Calves are nontender  Neurovascular she is intact            Data Reviewed:    Multiple x-rays left knee dated 3/14/2024 demonstrate a preserved joint space in all 3 compartments.  No fracture.  No dislocation.    MRI left knee from the outside dated 5/21/2024 demonstrates the ACL to be torn.  PCL is intact.  Medial and lateral menisci are intact.  There is a bone bruise in the lateral compartment.  There is any effusion                   Impression:   1.

## 2024-07-11 ENCOUNTER — TELEPHONE (OUTPATIENT)
Dept: ORTHOPEDIC SURGERY | Age: 34
End: 2024-07-11

## 2024-07-17 ENCOUNTER — OFFICE VISIT (OUTPATIENT)
Dept: ORTHOPEDIC SURGERY | Age: 34
End: 2024-07-17

## 2024-07-17 DIAGNOSIS — Z48.89 ENCOUNTER FOR POSTOPERATIVE CARE: Primary | ICD-10-CM

## 2024-07-17 PROCEDURE — 99024 POSTOP FOLLOW-UP VISIT: CPT | Performed by: ORTHOPAEDIC SURGERY

## 2024-07-17 RX ORDER — OXYCODONE HYDROCHLORIDE 10 MG/1
10 TABLET ORAL EVERY 6 HOURS PRN
Qty: 28 TABLET | Refills: 0 | Status: SHIPPED | OUTPATIENT
Start: 2024-07-17 | End: 2024-07-24

## 2024-07-17 RX ORDER — NALOXONE HYDROCHLORIDE 4 MG/.1ML
1 SPRAY NASAL PRN
Qty: 1 EACH | Refills: 0 | Status: SHIPPED | OUTPATIENT
Start: 2024-07-17

## 2024-07-17 NOTE — PROGRESS NOTES
Name: Isamar Reyes  YOB: 1990  Gender: female  MRN: [id number]            HPI: Isamar Reyes is a 33 y.o. female 6 weeks status post arthroscopy left knee ACL reconstruction utilizing autogenous ipsilateral bone patella tendon bone graft and lateral meniscal repair.  She returns and is doing well.  She is stiff and weak      ROS/Meds/PSH/PMH/FH/SH: A ten system review of systems was performed and is negative other than what is in the HPI.   Tobacco:  reports that she has never smoked. She has never used smokeless tobacco.  There were no vitals taken for this visit.     Physical Examination:  She is an awake alert pleasant female ambulating without difficulty    The right knee has a range of motion of 0 to 135 degrees  negative Lachman,  negative anterior drawer,   negative posterior drawer  negative pivot.   Good tibial step-off,   No varus or valgus laxity at 0 or 30 degrees.   Negative lateral joint line tenderness   negative lateral Aliza.   Negative medial joint line tenderness  negative medial Aliza.   No evidence of any posterolateral instability.   No patellofemoral pain.   Negative compression,   negative apprehension  no effusion.   Calves Are non-tender,  neurovascularly patient is intact.   Negative Homans.   MPFL is non-tender.   Patient Can fully extend the knee.   Good quad tone  No erythema.   Negative Dial test.    The left knee incision has healed  Profound quad atrophy on the left not present on the right  The left knee has a range of motion of 5 to 130 degrees  negative Lachman,  negative anterior drawer,   negative posterior drawer  negative pivot.   Good tibial step-off,   No varus or valgus laxity at 0 or 30 degrees.   Negative lateral joint line tenderness   negative lateral Aliza.   Negative medial joint line tenderness  negative medial Aliza.   No evidence of any posterolateral instability.   No patellofemoral pain.   Negative compression,   negative

## 2024-09-04 ENCOUNTER — OFFICE VISIT (OUTPATIENT)
Dept: ORTHOPEDIC SURGERY | Age: 34
End: 2024-09-04

## 2024-09-04 DIAGNOSIS — Z48.89 ENCOUNTER FOR POSTOPERATIVE CARE: Primary | ICD-10-CM

## 2024-09-04 PROCEDURE — 99024 POSTOP FOLLOW-UP VISIT: CPT | Performed by: ORTHOPAEDIC SURGERY

## 2024-09-04 NOTE — PROGRESS NOTES
Name: Isamar Reyes  YOB: 1990  Gender: female  MRN: [id number]            HPI: Isamar Reyes is a 34 y.o. female almost 3 months status post arthroscopy left knee ACL reconstruction utilizing autogenous ipsilateral bone patella tendon bone graft and lateral meniscal repair.  She returns and is doing well.  She is stiff and weak.  She has quad weakness and lacks extension      ROS/Meds/PSH/PMH/FH/SH: A ten system review of systems was performed and is negative other than what is in the HPI.   Tobacco:  reports that she has never smoked. She has never used smokeless tobacco.  There were no vitals taken for this visit.     Physical Examination:  She is an awake alert pleasant female ambulating without difficulty    The right knee has a range of motion of 0 to 135 degrees  negative Lachman,  negative anterior drawer,   negative posterior drawer  negative pivot.   Good tibial step-off,   No varus or valgus laxity at 0 or 30 degrees.   Negative lateral joint line tenderness   negative lateral Aliza.   Negative medial joint line tenderness  negative medial Aliza.   No evidence of any posterolateral instability.   No patellofemoral pain.   Negative compression,   negative apprehension  no effusion.   Calves Are non-tender,  neurovascularly patient is intact.   Negative Homans.   MPFL is non-tender.   Patient Can fully extend the knee.   Good quad tone  No erythema.   Negative Dial test.    The left knee incision has healed  Profound quad atrophy on the left not present on the right  The left knee has a range of motion of 5 to 130 degrees  negative Lachman,  negative anterior drawer,   negative posterior drawer  negative pivot.   Good tibial step-off,   No varus or valgus laxity at 0 or 30 degrees.   Negative lateral joint line tenderness   negative lateral Aliza.   Negative medial joint line tenderness  negative medial Aliza.   No evidence of any posterolateral instability.   No patellofemoral

## 2024-10-23 ENCOUNTER — TELEPHONE (OUTPATIENT)
Dept: ORTHOPEDIC SURGERY | Age: 34
End: 2024-10-23

## 2024-11-06 ENCOUNTER — OFFICE VISIT (OUTPATIENT)
Age: 34
End: 2024-11-06

## 2024-11-06 DIAGNOSIS — Z47.89 ORTHOPEDIC AFTERCARE: Primary | ICD-10-CM

## 2024-11-06 NOTE — PROGRESS NOTES
Name: Isamar Reyes  YOB: 1990  Gender: female  MRN: [id number]            HPI: Isamar Reyes is a 34 y.o. female 5 months status post arthroscopy left knee ACL reconstruction utilizing autogenous ipsilateral bone patella tendon bone graft and lateral meniscal repair.  She returns and is doing much better      ROS/Meds/PSH/PMH/FH/SH: A ten system review of systems was performed and is negative other than what is in the HPI.   Tobacco:  reports that she has never smoked. She has never used smokeless tobacco.  There were no vitals taken for this visit.     Physical Examination:  She is an awake alert pleasant female ambulating without difficulty    The right knee has a range of motion of 0 to 135 degrees  negative Lachman,  negative anterior drawer,   negative posterior drawer  negative pivot.   Good tibial step-off,   No varus or valgus laxity at 0 or 30 degrees.   Negative lateral joint line tenderness   negative lateral Aliza.   Negative medial joint line tenderness  negative medial Aliza.   No evidence of any posterolateral instability.   No patellofemoral pain.   Negative compression,   negative apprehension  no effusion.   Calves Are non-tender,  neurovascularly patient is intact.   Negative Homans.   MPFL is non-tender.   Patient Can fully extend the knee.   Good quad tone  No erythema.   Negative Dial test.    The left knee incision has healed   quad atrophy on the left not present on the right but improving  The left knee has a range of motion of 0 to 135 degrees  negative Lachman,  negative anterior drawer,   negative posterior drawer  negative pivot.   Good tibial step-off,   No varus or valgus laxity at 0 or 30 degrees.   Negative lateral joint line tenderness   negative lateral Aliza.   Negative medial joint line tenderness  negative medial Aliza.   No evidence of any posterolateral instability.   No patellofemoral pain.   Negative compression,   negative apprehension  no

## 2025-01-21 ENCOUNTER — OFFICE VISIT (OUTPATIENT)
Dept: ORTHOPEDIC SURGERY | Age: 35
End: 2025-01-21
Payer: OTHER GOVERNMENT

## 2025-01-21 VITALS — HEIGHT: 62 IN | BODY MASS INDEX: 26.68 KG/M2 | WEIGHT: 145 LBS

## 2025-01-21 DIAGNOSIS — Z47.89 ORTHOPEDIC AFTERCARE: Primary | ICD-10-CM

## 2025-01-21 PROCEDURE — 99212 OFFICE O/P EST SF 10 MIN: CPT | Performed by: ORTHOPAEDIC SURGERY

## 2025-01-21 NOTE — PROGRESS NOTES
Name: Isamar Reyes  YOB: 1990  Gender: female  MRN: [id number]            HPI: Isamar Reyes is a 34 y.o. female 7.5 months status post arthroscopy left knee ACL reconstruction utilizing autogenous ipsilateral bone patella tendon bone graft and lateral meniscal repair.  She returns and is doing much better      ROS/Meds/PSH/PMH/FH/SH: A ten system review of systems was performed and is negative other than what is in the HPI.   Tobacco:  reports that she has never smoked. She has never used smokeless tobacco.  Ht 1.575 m (5' 2\")   Wt 65.8 kg (145 lb)   BMI 26.52 kg/m²      Physical Examination:  She is an awake alert pleasant female ambulating without difficulty    The right knee has a range of motion of 0 to 135 degrees  negative Lachman,  negative anterior drawer,   negative posterior drawer  negative pivot.   Good tibial step-off,   No varus or valgus laxity at 0 or 30 degrees.   Negative lateral joint line tenderness   negative lateral Aliza.   Negative medial joint line tenderness  negative medial Aliza.   No evidence of any posterolateral instability.   No patellofemoral pain.   Negative compression,   negative apprehension  no effusion.   Calves Are non-tender,  neurovascularly patient is intact.   Negative Homans.   MPFL is non-tender.   Patient Can fully extend the knee.   Good quad tone  No erythema.   Negative Dial test.    The left knee incision has healed   quad atrophy on the left not present on the right but improving  The left knee has a range of motion of 0 to 135 degrees  negative Lachman,  negative anterior drawer,   negative posterior drawer  negative pivot.   Good tibial step-off,   No varus or valgus laxity at 0 or 30 degrees.   Negative lateral joint line tenderness   negative lateral Aliza.   Negative medial joint line tenderness  negative medial Aliza.   No evidence of any posterolateral instability.   No patellofemoral pain.   Negative compression,   negative

## 2025-02-03 ENCOUNTER — TELEPHONE (OUTPATIENT)
Dept: ORTHOPEDIC SURGERY | Age: 35
End: 2025-02-03

## 2025-02-03 NOTE — TELEPHONE ENCOUNTER
Please contact [facility] for additional visit  scheduled on 3/25/25 for Dr. Montoya. I do not have access to request this. I also lvm with pt. To contact [facility].

## 2025-02-28 ENCOUNTER — TELEPHONE (OUTPATIENT)
Dept: ORTHOPEDIC SURGERY | Age: 35
End: 2025-02-28

## 2025-02-28 NOTE — TELEPHONE ENCOUNTER
I do not have access of this referral on [facility] website, to request additional visits for up coming appt. In March. Please check.    Thanks.

## 2025-03-19 ENCOUNTER — TELEPHONE (OUTPATIENT)
Dept: ORTHOPEDIC SURGERY | Age: 35
End: 2025-03-19

## 2025-03-19 NOTE — TELEPHONE ENCOUNTER
Just fyi, extended old referral exp. Date from 3/21/2025 to 04/15/2025. This pt. Is having visit 2 visit left after 3/25th visit and exp. Date is 4/15/2025 under old referral. Any appt. Schedule with Dr. Montoya after 04/15/2025 will require new referral from Jd

## 2025-03-25 ENCOUNTER — OFFICE VISIT (OUTPATIENT)
Dept: ORTHOPEDIC SURGERY | Age: 35
End: 2025-03-25
Payer: OTHER GOVERNMENT

## 2025-03-25 DIAGNOSIS — Z47.89 ORTHOPEDIC AFTERCARE: Primary | ICD-10-CM

## 2025-03-25 PROCEDURE — 99212 OFFICE O/P EST SF 10 MIN: CPT | Performed by: ORTHOPAEDIC SURGERY

## 2025-03-25 NOTE — PROGRESS NOTES
instability.   No patellofemoral pain.   Negative compression,   negative apprehension  no effusion.   Calves Are non-tender,  neurovascularly patient is intact.   Negative Homans.   MPFL is non-tender.   Patient Can fully extend the knee.   Good quad tone  No erythema.   Negative Dial test.                Data Reviewed:            KT testing demonstrates a 2 mm side-to-side differential  Isokinetic testing demonstrates a 47% quadriceps deficit at low speed and 36% deficit at high speed        Multiple x-rays left knee dated 3/14/2024 demonstrate a preserved joint space in all 3 compartments.  No fracture.  No dislocation.    MRI left knee from the outside dated 5/21/2024 demonstrates the ACL to be torn.  PCL is intact.  Medial and lateral menisci are intact.  There is a bone bruise in the lateral compartment.  There is any effusion                   Impression:   1. Orthopedic aftercare       Stable status post arthroscopy left knee ACL reconstruction utilizing autogenous ipsilateral bone patella tendon bone graft and lateral meniscal repair 10 months  Improvingquad atrophy on the left    Plan:   I discussed the plan with the patient.  I am happy with her stability and mobility she needs to work on her quad strength.  We will continue physical therapy for 6 weeks.  I will recheck her back in 2 months.  No cutting sports for now.  Prior to her next visit we will repeat her isokinetic testing of her left lower extremity.  I will recheck her back in 2 months    2.  Self-limited problem    Follow up: Return in about 2 months (around 5/25/2025).           SEAN WERNER JR, MD

## 2025-05-06 ENCOUNTER — TELEPHONE (OUTPATIENT)
Dept: ORTHOPEDIC SURGERY | Age: 35
End: 2025-05-06

## 2025-05-07 ENCOUNTER — TELEPHONE (OUTPATIENT)
Dept: ORTHOPEDIC SURGERY | Age: 35
End: 2025-05-07

## 2025-05-07 NOTE — TELEPHONE ENCOUNTER
Hi Cynthia!    I got referral from [facility] but its is for dx code left knee, appt. Scheduled is for follow up on shoulder. Please cklarify it is for shoulder? If yes? We have seen earlier for shoulder?

## 2025-05-27 ENCOUNTER — OFFICE VISIT (OUTPATIENT)
Dept: ORTHOPEDIC SURGERY | Age: 35
End: 2025-05-27
Payer: OTHER GOVERNMENT

## 2025-05-27 DIAGNOSIS — M62.552 ATROPHY OF QUADRICEPS FEMORIS MUSCLE OF LEFT THIGH: ICD-10-CM

## 2025-05-27 DIAGNOSIS — Z47.89 ORTHOPEDIC AFTERCARE: Primary | ICD-10-CM

## 2025-05-27 PROCEDURE — 99212 OFFICE O/P EST SF 10 MIN: CPT | Performed by: ORTHOPAEDIC SURGERY

## 2025-05-27 NOTE — PROGRESS NOTES
Name: Isamar Reyes  YOB: 1990  Gender: female  MRN: [id number]            HPI: Isamar Reyes is a 34 y.o. female almost 10 months status post arthroscopy left knee ACL reconstruction utilizing autogenous ipsilateral bone patella tendon bone graft and lateral meniscal repair.  She returns and is doing much better.   Both legs are getting stronger      ROS/Meds/PSH/PMH/FH/SH: A ten system review of systems was performed and is negative other than what is in the HPI.   Tobacco:  reports that she has never smoked. She has never used smokeless tobacco.  There were no vitals taken for this visit.     Physical Examination:  She is an awake alert pleasant female ambulating without difficulty    The right knee has a range of motion of 0 to 135 degrees  negative Lachman,  negative anterior drawer,   negative posterior drawer  negative pivot.   Good tibial step-off,   No varus or valgus laxity at 0 or 30 degrees.   Negative lateral joint line tenderness   negative lateral Aliza.   Negative medial joint line tenderness  negative medial Aliza.   No evidence of any posterolateral instability.   No patellofemoral pain.   Negative compression,   negative apprehension  no effusion.   Calves Are non-tender,  neurovascularly patient is intact.   Negative Homans.   MPFL is non-tender.   Patient Can fully extend the knee.   Good quad tone  No erythema.   Negative Dial test.    The left knee incision has healed   quad atrophy on the left not present on the right but improving  The left knee has a range of motion of 0 to 135 degrees  negative Lachman,  negative anterior drawer,   negative posterior drawer  negative pivot.   Good tibial step-off,   No varus or valgus laxity at 0 or 30 degrees.   Negative lateral joint line tenderness   negative lateral Aliza.   Negative medial joint line tenderness  negative medial Aliza.   No evidence of any posterolateral instability.   No patellofemoral pain.   Negative

## 2025-08-05 DIAGNOSIS — Z47.89 ORTHOPEDIC AFTERCARE: Primary | ICD-10-CM

## 2025-08-05 DIAGNOSIS — M62.552 ATROPHY OF QUADRICEPS FEMORIS MUSCLE OF LEFT THIGH: ICD-10-CM

## (undated) DEVICE — STRIP,CLOSURE,WOUND,MEDI-STRIP,1/2X4: Brand: MEDLINE

## (undated) DEVICE — PAD,ABDOMINAL,5"X9",ST,LF,25/BX: Brand: MEDLINE INDUSTRIES, INC.

## (undated) DEVICE — KNIFE SURG 10MM GRFT DISP FOR ACL RECON

## (undated) DEVICE — OCCLUSIVE GAUZE STRIP,3% BISMUTH TRIBROMOPHENATE IN PETROLATUM BLEND: Brand: XEROFORM

## (undated) DEVICE — KNEE ARTHRO ACL DR POSTA: Brand: MEDLINE INDUSTRIES, INC.

## (undated) DEVICE — SUTURE PROL SZ 2-0 L18IN NONABSORBABLE BLU FS L26MM 3/8 CIR 8685H

## (undated) DEVICE — GAUZE,SPONGE,4"X4",16PLY,STRL,LF,10/TRAY: Brand: MEDLINE

## (undated) DEVICE — SOLUTION IRRIG 3000ML 0.9% SOD CHL USP UROMATIC PLAS CONT

## (undated) DEVICE — SUTURE ETHILON SZ 2-0 L18IN NONABSORBABLE BLK L26MM PS 3/8 585H

## (undated) DEVICE — Device: Brand: JELCO

## (undated) DEVICE — PADDING CAST W6INXL4YD COT LO LINTING WYTEX

## (undated) DEVICE — BANDAGE COMPR 396IN LEN 6IN W 1 LAYR CLP CLSR COT E W/ CLP

## (undated) DEVICE — SPLINT KNEE L20IN FOR 32IN THGH UNIV FOAM 3 PC DSGN TRIMMED

## (undated) DEVICE — SPLINT KNEE UNIV FOR LESS THAN 36IN L20IN FOAM LAM E CNTCT

## (undated) DEVICE — PRECISION THIN (9.0 X 0.38 X 31.0MM)

## (undated) DEVICE — SPONGE GZ W4XL4IN COT 12 PLY TYP VII WVN C FLD DSGN STERILE

## (undated) DEVICE — ZIMMER® STERILE DISPOSABLE TOURNIQUET CUFF WITH PROTECTIVE SLEEVE, DUAL PORT, SINGLE BLADDER, 34 IN. (86 CM)

## (undated) DEVICE — SUTURE VICRYL SZ 0 L27IN ABSRB UD L36MM CP-1 1/2 CIR REV CUT J267H

## (undated) DEVICE — Device

## (undated) DEVICE — STOCKINETTE,IMPERVIOUS,12X48,STERILE: Brand: MEDLINE